# Patient Record
Sex: MALE | Race: WHITE
[De-identification: names, ages, dates, MRNs, and addresses within clinical notes are randomized per-mention and may not be internally consistent; named-entity substitution may affect disease eponyms.]

---

## 2017-05-22 ENCOUNTER — HOSPITAL ENCOUNTER (EMERGENCY)
Dept: HOSPITAL 44 - ED | Age: 42
Discharge: HOME | End: 2017-05-22
Payer: SELF-PAY

## 2017-05-22 VITALS — SYSTOLIC BLOOD PRESSURE: 132 MMHG | DIASTOLIC BLOOD PRESSURE: 78 MMHG

## 2017-05-22 DIAGNOSIS — N20.0: Primary | ICD-10-CM

## 2017-05-22 LAB
AMORPHOUS SEDIMENT,UR: (no result)
PH UR STRIP: 8.5 [PH] (ref 5–8)
RBC UR QL: (no result)
UROBILINOGEN URINE: 0.2 EU (ref 0.2–1)

## 2017-05-22 PROCEDURE — S1016 NON-PVC INTRAVENOUS ADMINIST: HCPCS

## 2017-05-22 PROCEDURE — 99284 EMERGENCY DEPT VISIT MOD MDM: CPT

## 2017-05-22 PROCEDURE — 96374 THER/PROPH/DIAG INJ IV PUSH: CPT

## 2017-05-22 PROCEDURE — 96372 THER/PROPH/DIAG INJ SC/IM: CPT

## 2017-05-22 PROCEDURE — 96361 HYDRATE IV INFUSION ADD-ON: CPT

## 2017-05-22 PROCEDURE — 81002 URINALYSIS NONAUTO W/O SCOPE: CPT

## 2017-05-22 PROCEDURE — 96375 TX/PRO/DX INJ NEW DRUG ADDON: CPT

## 2017-05-22 PROCEDURE — 87086 URINE CULTURE/COLONY COUNT: CPT

## 2017-05-22 PROCEDURE — 74176 CT ABD & PELVIS W/O CONTRAST: CPT

## 2017-05-22 NOTE — ED PHYSICIAN DOCUMENTATION
Male Genitourinary Problems





- HISTORIAN


Historian: patient





- HPI


Stated Complaint: flank pain


Chief Complaint: Male Genitourinary Problems


Onset: hours (0100)


Duration: continues in ED, worse


Severity: severe


Further Comments: yes (41 year old male patient presents with left flank pain 

which started at 0100, with nausea. Patient reports a history of renal calculi.)





- Associated Symptoms


Problems Urinating: pain w/ urination


Flank Pain: left sided


Abdominal Pain: LLQ





- ROS


CONST: none


GI/: nausea, problems urinating (pain).  denies: vomiting, abdominal pain


MS/SKIN/LYMPH: none


CVS/RESP: none


EYES/ENT: none





- PAST HX


Past History: other (9mm renal calculi 2007, seizures, HLD, depression)


Cardiac Disease: none


Allergies/Adverse Reactions: 


 Allergies











Allergy/AdvReac Type Severity Reaction Status Date / Time


 


No Known Allergies Allergy   Unverified 05/22/17 08:27














Home Medications: 


 Ambulatory Orders











 Medication  Instructions  Recorded


 


Cholecalciferol [Vitamin D-3] 2,000 unit PO DAILY 05/22/17


 


Fluoxetine HCl [Prozac] 60 mg PO DAILY 05/22/17


 


Phenytoin Sodium Extended 100 mg PO QID 05/22/17





[Dilantin]  


 


Simvastatin [Zocor] 40 mg PO D 05/22/17


 


Sulfamethoxazole/Trimethoprim 1 each PO BID #20 tab 05/22/17





[Bactrim Ds]  


 


Trazodone HCl [Desyrel] 100 mg PO PRN 05/22/17














- SOCIAL HX


Smoking History: cigarettes





- FAMILY HX


Family History: denies: none





- VITAL SIGNS


Vital Signs: 


 Vital Signs











Temp Pulse Resp BP Pulse Ox


 


 98.3 F   82   18   132/78   98 


 


 05/22/17 12:17  05/22/17 12:17  05/22/17 12:17  05/22/17 12:17  05/22/17 12:17














- REVIEWED ASSESSMENTS


Nursing Assessment  Reviewed: Yes


Vitals Reviewed: Yes





Progress





- Progress


Progress: 


No improvement in pain with toradol injection; 





Patient medicated for pain fentanyl.  





Consult with urology, Dr Santiago. Reviewed CT results - ok to discharge patient 

home, flomax and follow up in office.  





Additional pain medication given at discharge.  Instructed patient to return to 

ER if pain was not controlled at home. 





ED Results Lab/Radiology





- Lab Results


Lab Results: 


 Lab Results











  05/22/17





  08:05


 


Urine Color  Yellow 





   (YELLOW) 


 


Urine Appearance  Cloudy 





   (CLEAR) 


 


Urine pH  8.5 





   (5.0 - 8.0) 


 


Ur Specific Gravity  1.015 





   (1.010-1.030) 


 


Urine Protein  1+ mg/dL H mg/dL





   (NEGATIVE) 


 


Urine Ketones  Negative mg/dL mg/dL





   (NEGATIVE) 


 


Urine Occult Blood  2+  H 





   (NEGATIVE) 


 


Urine Nitrite  Negative 





   (NEGATIVE) 


 


Urine Bilirubin  Negative 





   (NEGATIVE) 


 


Urine Urobilinogen  0.2 Eu Eu





   (0.2-1.0) 


 


Ur Leukocyte Esterase  Negative 





   (NEGATIVE) 


 


Urine RBC  10-25  H 





   (0-2 HPF) 


 


Urine WBC  5-10  H 





   (0-5 HPF) 


 


Ur Squamous Epith Cells  Few 





   (NEG-FEW) 


 


Amorphous Sediment  Moderate  H 





   (NEGATIVE) 


 


Urine Bacteria  Moderate  H 





   (NEGATIVE) 


 


Urine Mucus  Present  H 





   (NEGATIVE) 


 


Urine Glucose  Negative mg/dL mg/dL





   (NEGATIVE) 














- Radiology


Radiology Impressions: 





HISTORY:   


Left flank pain and hematuria 





FINDINGS:   


Transverse abdomen and pelvis sections are obtained without contrast.  Mild 

left hydroureteronephrosis and perinephric stranding are observed.  A 5 x 11 mm 

proximal left ureteral stone is observed just beyond the ureteropelvic 

junction.  2 mm and 9 mm right intrarenal stones and a punctate left intrarenal 

stone are also present.  The gallbladder, liver, pancreas, adrenals, spleen, 

mesenteric structures, and great vessels are unremarkable.  Bowel loops exhibit 

normal caliber and wall thickness.  No significant lumbar abnormality is 

observed.  The appendix is normal.   





Pelvic sections reveal unremarkable bowel loops, prostate, seminal vesicles, 

and urinary bladder.  No acute pelvic abnormality is observed.  Pelvic 

phleboliths are noted. 





IMPRESSION:   


5 x 11 mm proximal left ureteral stone with mild obstructive uropathy. 





Bilateral intrarenal stones.


 


Electronically signed on May 22, 2017 10:31:46 AM CDT by:


Ayaz Keyes





- Orders


Orders: 


 ED Orders











 Category Date Time Status


 


 Place Saline Lock/IV NOW Care  05/22/17 08:24 Active


 


 CT ABD & PELVIS W/O CON Stat Exams  05/22/17 Completed


 


 UA W/MICRO IF INDICATED Stat Lab  05/22/17 08:05 Completed


 


 URINE CULTURE Stat Lab  05/22/17 08:05 Received


 


 0.9 % Sodium Chloride [Normal Saline] 1,000 ml Med  05/22/17 08:52 Discontinued





 IV .STK-MED   


 


 0.9 % Sodium Chloride [Normal Saline] 1,000 ml Med  05/22/17 08:50 Discontinued





 IV NOW   


 


 0.9 % Sodium Chloride [Normal Saline] 1,000 ml Med  05/22/17 09:58 Discontinued





 IV NOW   


 


 Butorphanol Tartrate [Stadol] Med  05/22/17 11:20 Discontinued





 2 mg IM NOW ONE   


 


 Ketorolac Tromethamine [Toradol] Med  05/22/17 08:52 Discontinued





 30 mg .ROUTE .STK-MED ONE   


 


 Ketorolac Tromethamine [Toradol] Med  05/22/17 08:50 Discontinued





 30 mg IVP NOW ONE   


 


 Tamsulosin HCl [Flomax] Med  05/22/17 10:36 Discontinued





 0.4 mg PO NOW ONE   


 


 fentaNYL CITRATE/PF [Duragesic] Med  05/22/17 09:25 Discontinued





 50 mcg IVP NOW ONE   














Male Genitourinary Problems





- EXAM


General Appearance: moderate distress


Abdomen: non-tender, no organomegaly


EENT: eye inspection normal, LINDY


Respiratory: no resp distress, chest non-tender, breath sounds normal


CVS: reg rate & rhythm, heart sounds normal, equal pulses, no murmur, no gallop

, PMI nml, no JVD, no friction rub, 24


Back: CVA tenderness (left)


Extremities: normal range of motion, non-tender, normal inspection, no pedal 

edema, no calf tenderness, normal capillary refill, pelvis stable


Neuro/Psych: oriented X3, CN's nml as tested, motor nml, sensation nml, mood/

affect nml


Skin: normal color, warm/dry, NR, INT, PAL, DR





Discharge


Clincal Impression: 


 Renal calculus, left





Prescriptions: 


Sulfamethoxazole/Trimethoprim [Bactrim Ds] 1 each PO BID #20 tab


Referrals: 


Primary Doctor,No [Primary Care Provider] - 2 Days


Additional Instructions: 


Diagnosis:


5x11mm stone


UTI





Call Dr Santiago office today - make a follow up visit asap.  Take your CD and 

report to the visit


[phone]











Return to the Er if you pain is not controlled, you are unable to void, or 

cannot keep down your medications. 


Home Medications: 


Ambulatory Orders





Cholecalciferol [Vitamin D-3] 2,000 unit PO DAILY 05/22/17 


Fluoxetine HCl [Prozac] 60 mg PO DAILY 05/22/17 


Phenytoin Sodium Extended [Dilantin] 100 mg PO QID 05/22/17 


Simvastatin [Zocor] 40 mg PO D 05/22/17 


Sulfamethoxazole/Trimethoprim [Bactrim Ds] 1 each PO BID #20 tab 05/22/17 


Trazodone HCl [Desyrel] 100 mg PO PRN 05/22/17 








Condition: Stable


Disposition: 01 HOME, SELF-CARE


Decision to Admit: NO


Decision Time: 12:05

## 2017-05-22 NOTE — DIAGNOSTIC IMAGING REPORT
[facility]

[address] [facility] [address]

[place], [place]. [address]

 

 

 

 

Report Submission Date: May 22, 2017 10:31:46 AM CDT

Patient       Study

Name:   ERNIE CALVIN       Date:   May 22, 2017 9:02:06 AM CDT

MRN:   [id number]       Modality Type:   CT\SR

Gender:   M       Description:   CT ABD & PELVIS W/O CO

:   77       Institution:   [facility]

Physician   RAMAN BRENNAN (NP) - ER            

 

Computed tomography of the abdomen and pelvis without contrast 



HISTORY:   

Left flank pain and hematuria 



FINDINGS:   

Transverse abdomen and pelvis sections are obtained without contrast.  Mild 
left hydroureteronephrosis and perinephric stranding are observed.  A 5 x 11 mm 
proximal left ureteral stone is observed just beyond the ureteropelvic 
junction.  2 mm and 9 mm right intrarenal stones and a punctate left intrarenal 
stone are also present.  The gallbladder, liver, pancreas, adrenals, spleen, 
mesenteric structures, and great vessels are unremarkable.  Bowel loops exhibit 
normal caliber and wall thickness.  No significant lumbar abnormality is 
observed.  The appendix is normal.   



Pelvic sections reveal unremarkable bowel loops, prostate, seminal vesicles, 
and urinary bladder.  No acute pelvic abnormality is observed.  Pelvic 
phleboliths are noted. 



IMPRESSION:   

5 x 11 mm proximal left ureteral stone with mild obstructive uropathy. 



Bilateral intrarenal stones.

 

Electronically signed on May 22, 2017 10:31:46 AM CDT by:

Ayaz DEY

## 2017-06-09 ENCOUNTER — HOSPITAL ENCOUNTER (EMERGENCY)
Dept: HOSPITAL 44 - ED | Age: 42
Discharge: HOME | End: 2017-06-09
Payer: SELF-PAY

## 2017-06-09 VITALS — SYSTOLIC BLOOD PRESSURE: 132 MMHG | DIASTOLIC BLOOD PRESSURE: 68 MMHG

## 2017-06-09 DIAGNOSIS — N20.0: Primary | ICD-10-CM

## 2017-06-09 PROCEDURE — 99283 EMERGENCY DEPT VISIT LOW MDM: CPT

## 2017-06-09 NOTE — ED PHYSICIAN DOCUMENTATION
Male Genitourinary Problems





- HISTORIAN


Historian: patient





- HPI


Stated Complaint: "PERCOCET REFILL"


Chief Complaint: Male Genitourinary Problems


Additional Information: 





here for percocet refill re kidney stones.  cleared prov by DR BRITO - yes by 

hosp adm after finding urologists out of town and confirming appt for 

lithotripsy at [facility] as [place] states.


Onset: other (chronic recurrent-lab reveals multi stones both kidneys prev ct 

[facility])


Duration: continues in ED


Context: drug use


Severity: moderate, severe





- Associated Symptoms


Abdominal Pain: none





- Sexual History


Sexual History: non-contributory





- ROS


CONST: none


GI/: nausea, abdominal pain.  denies: vomiting


MS/SKIN/LYMPH: none


CVS/RESP: none





- PAST HX


Past History: kidney stones (high cholesterol)


Cardiac Disease: none


Surgeries/Procedures: tonsillectomy


Allergies/Adverse Reactions: 


 Allergies











Allergy/AdvReac Type Severity Reaction Status Date / Time


 


No Known Allergies Allergy   Unverified 05/22/17 08:27














Home Medications: 


 Ambulatory Orders











 Medication  Instructions  Recorded


 


Cholecalciferol [Vitamin D-3] 2,000 unit PO DAILY 05/22/17


 


Fluoxetine HCl [Prozac] 60 mg PO DAILY 05/22/17


 


Phenytoin Sodium Extended 100 mg PO QID 05/22/17





[Dilantin]  


 


Simvastatin [Zocor] 40 mg PO D 05/22/17


 


Sulfamethoxazole/Trimethoprim 1 each PO BID #20 tab 05/22/17





[Bactrim Ds]  


 


Trazodone HCl [Desyrel] 100 mg PO HS PRN 05/22/17














- SOCIAL HX


Smoking History: non-smoker (none for 3 weeks)


Alcohol Use: none


Drug Use: none





- FAMILY HX


Family History: none





- VITAL SIGNS


Vital Signs: 





 Vital Signs











Temp Pulse Resp BP Pulse Ox


 


 97 F L  87   18   128/96   98 


 


 06/09/17 15:25  06/09/17 15:25  06/09/17 15:25  06/09/17 15:25  06/09/17 15:25














- REVIEWED ASSESSMENTS


Nursing Assessment  Reviewed: Yes


Vitals Reviewed: Yes





ED Results Lab/Radiology





- Orders


Orders: 





 ED Orders











 Category Date Time Status


 


 oxyCODONE HCL/ACETAMINOPHEN [Percocet 5-325 mg Tablet] Med  06/09/17 16:05 

Discontinued





 1 each PO .STK-MED ONE   


 


 oxyCODONE HCL/ACETAMINOPHEN [Percocet 5-325 mg Tablet] Med  06/09/17 16:07 

Discontinued





 1 each PO NOW ONE   














Male Genitourinary Problems





- EXAM


General Appearance: moderate distress


Abdomen: tenderness.  No: non-tender, guarding, rebound


EENT: ENT inspection normal


Respiratory: no resp distress, chest non-tender, breath sounds normal.  No: 

wheezes, rales, rhonchi


CVS: reg rate & rhythm, heart sounds normal


Extremities: normal range of motion


Neuro/Psych: oriented X3, motor nml, sensation nml, mood/affect nml


Skin: diaphoresis





Discharge


Clincal Impression: 


 Renal lithiasis





Referrals: 


Primary Doctor,No [Primary Care Provider] - 2 Days


Home Medications: 


Ambulatory Orders





Cholecalciferol [Vitamin D-3] 2,000 unit PO DAILY 05/22/17 


Fluoxetine HCl [Prozac] 60 mg PO DAILY 05/22/17 


Phenytoin Sodium Extended [Dilantin] 100 mg PO QID 05/22/17 


Simvastatin [Zocor] 40 mg PO D 05/22/17 


Sulfamethoxazole/Trimethoprim [Bactrim Ds] 1 each PO BID #20 tab 05/22/17 


Trazodone HCl [Desyrel] 100 mg PO HS PRN 05/22/17 








Condition: Fair


Disposition: 01 HOME, SELF-CARE


Decision to Admit: NO


Decision Time: 16:59

## 2017-07-19 ENCOUNTER — HOSPITAL ENCOUNTER (EMERGENCY)
Dept: HOSPITAL 44 - ED | Age: 42
Discharge: HOME | End: 2017-07-19
Payer: COMMERCIAL

## 2017-07-19 VITALS — DIASTOLIC BLOOD PRESSURE: 67 MMHG | SYSTOLIC BLOOD PRESSURE: 132 MMHG

## 2017-07-19 DIAGNOSIS — N20.0: Primary | ICD-10-CM

## 2017-07-19 LAB
AMORPHOUS SEDIMENT,UR: (no result)
BASOPHILS NFR BLD: 0.6 % (ref 0–1.5)
EGFR (AFRICAN): > 60
EGFR (NON-AFRICAN): > 60
EOSINOPHIL NFR BLD: 2.4 % (ref 0–6.8)
MCH RBC QN AUTO: 31.8 PG (ref 28–34)
MCV RBC AUTO: 91.6 FL (ref 80–100)
MONOCYTES %: 4.8 % (ref 0–11)
NEUTROPHILS #: 4.9 # K/UL (ref 1.4–7.7)
PH UR STRIP: 5.5 [PH] (ref 5–8)
RBC UR QL: (no result)
UROBILINOGEN URINE: 0.2 EU (ref 0.2–1)

## 2017-07-19 PROCEDURE — 99283 EMERGENCY DEPT VISIT LOW MDM: CPT

## 2017-07-19 PROCEDURE — 85025 COMPLETE CBC W/AUTO DIFF WBC: CPT

## 2017-07-19 PROCEDURE — 96361 HYDRATE IV INFUSION ADD-ON: CPT

## 2017-07-19 PROCEDURE — 81002 URINALYSIS NONAUTO W/O SCOPE: CPT

## 2017-07-19 PROCEDURE — 74176 CT ABD & PELVIS W/O CONTRAST: CPT

## 2017-07-19 PROCEDURE — 87086 URINE CULTURE/COLONY COUNT: CPT

## 2017-07-19 PROCEDURE — 80053 COMPREHEN METABOLIC PANEL: CPT

## 2017-07-19 PROCEDURE — S1016 NON-PVC INTRAVENOUS ADMINIST: HCPCS

## 2017-07-19 PROCEDURE — 96374 THER/PROPH/DIAG INJ IV PUSH: CPT

## 2017-07-19 NOTE — ED PHYSICIAN DOCUMENTATION
General Adult





- HISTORIAN


Historian: patient





- HPI


Stated Complaint: Hematuria


Chief Complaint: General Adult


Additional Information: 





Right Lithotripsy and Left stone retrieval with stent placement on 7/11, for 

two stones he says were 5x11 mm. Left flank and urethral pain and hematuria 

began this morning. Says he called his urologist and was told to come to ER. 





- ROS


CONST: no problems





- PAST HX


Past History: other (kidney stones)


Allergies/Adverse Reactions: 


 Allergies











Allergy/AdvReac Type Severity Reaction Status Date / Time


 


No Known Allergies Allergy   Verified 07/19/17 13:13














Home Medications: 


 Ambulatory Orders











 Medication  Instructions  Recorded


 


Cholecalciferol [Vitamin D-3] 2,000 unit PO DAILY 05/22/17


 


Fluoxetine HCl [Prozac] 60 mg PO DAILY 05/22/17


 


Phenytoin Sodium Extended 100 mg PO QID 05/22/17





[Dilantin]  


 


Simvastatin [Zocor] 40 mg PO D 05/22/17


 


Trazodone HCl [Desyrel] 100 mg PO HS PRN 05/22/17














- SOCIAL HX


Smoking History: non-smoker





- FAMILY HX


Family History: No





- VITAL SIGNS


Vital Signs: 





 Vital Signs











Temp Pulse Resp BP Pulse Ox


 


 99.1 F   69   18   147/94   97 


 


 07/19/17 13:14  07/19/17 13:14  07/19/17 13:14  07/19/17 13:14  07/19/17 13:14














- REVIEWED ASSESSMENTS


Nursing Assessment  Reviewed: Yes


Vitals Reviewed: Yes





Progress





- Progress


Progress: 














Examination: CT Abdomen/pelvis 





History: Left flank discomfort 





Comparison exams: 22 May 2017 





Technique: CT Abdomen/pelvis without contrast protocol.  





Findings: Renal cortical margins are symmetric. Left ureteral stent in place. 

No suspicious left-sided renal calcification. Right kidney demonstrates 

numerous calcifications involving the inferior margin: largest cluster measures 

7 mm. Mild dilation of the right ureter. At the ureterovesicular junction is 4 

mm calcification. Faint sub millimeter calcifications within the distal ureter. 

Bladder as visualized is without other gross irregularity given technique. 

Scattered pelvic phleboliths. 





Liver, spleen, adrenals, gallbladder and pancreas are without gross 

irregularity.  Bowel unopacified limiting evaluation. Appendix region without 

inflammatory changes.  No mesenteric inflammatory changes or free fluid. Hiatal 

hernia. 





Osseous structures demonstrate degenerative changes. Lung bases without 

infiltrate.  





Impression: Left ureteral stent in place. 





4 mm Right ureterolithiasis - stone at the right ureterovesicular junction. 

Dilation of the proximal right ureter with possible small submillimeter 

residual calcifications within the distal ureter lumen. 





Right nephrolithiasis. 





Hiatal hernia.





 








Electronically signed on Jul 19, 2017 2:19:49 PM CDT by:





Darshan Lao








Script filled 7/11for #50 5/325 percocet. Wants pain meds. 





ED Results Lab/Radiology





- Orders


Orders: 





 ED Orders











 Category Date Time Status


 


 Place IV Lock 1T Care  07/19/17 13:26 Active


 


 CT ABD & PELVIS W/O CON Stat Exams  07/19/17 Ordered


 


 CBC/PLATELET/DIFF Routine Lab  07/19/17 Ordered


 


 CMP [CMP] Routine Lab  07/19/17 Ordered


 


 UA [URINALYSIS] Routine Lab  07/19/17 13:30 Received


 


 0.9 % Sodium Chloride [Normal Saline] 1,000 ml Med  07/19/17 13:27 Active





 IV Q1H   


 


 Ketorolac Tromethamine [Toradol] Med  07/19/17 13:26 Discontinued





 30 mg IVP NOW ONE   














General Adult Physical Exam





- PHYSICAL EXAM


GENERAL APPEARANCE: mild distress


EENT: eye inspection normal, ENT inspection normal


NECK: normal inspection, supple


RESPIRATORY: no resp distress, breath sounds normal


CVS: reg rate & rhythm, heart sounds normal, no murmur


ABDOMEN: soft, normal bowel sounds


RECTAL: deferred


BACK: normal inspection, no CVA tenderness, other (no vertebral tenderness)


SKIN: warm/dry, normal color


EXTREMITIES: no evidence of injury, no edema


NEURO: CN's nml as tested, motor nml, sensation nml, cognition normal





Discharge


Clincal Impression: 


 Kidney stone on right side





Referrals: 


Primary Doctor,No [Primary Care Provider] - 2 Days


Home Medications: 


Ambulatory Orders





Cholecalciferol [Vitamin D-3] 2,000 unit PO DAILY 05/22/17 


Fluoxetine HCl [Prozac] 60 mg PO DAILY 05/22/17 


Phenytoin Sodium Extended [Dilantin] 100 mg PO QID 05/22/17 


Simvastatin [Zocor] 40 mg PO D 05/22/17 


Trazodone HCl [Desyrel] 100 mg PO HS PRN 05/22/17 








Condition: Good


Disposition: 01 HOME, SELF-CARE


Decision to Admit: NO


Decision Time: 14:30

## 2017-07-19 NOTE — DIAGNOSTIC IMAGING REPORT
SHOLA AVILA - BIB 

[facility]

[id number] [address]

[place], [place]. [address]

 

 

 

 

Report Submission Date: 2017 2:19:49 PM CDT

Patient       Study

Name:   ERNIE CALVIN       Date:   2017 1:47:02 PM CDT

MRN:   [id number]       Modality Type:   CT\SR

Gender:   M       Description:   CT ABD & PELVIS W/O CO

:   77       Institution:   [facility]

Physician:   SHOLA AVILA

         

 

 

Examination: CT Abdomen/pelvis 



History: Left flank discomfort 



Comparison exams: 22 May 2017 



Technique: CT Abdomen/pelvis without contrast protocol.  



Findings: Renal cortical margins are symmetric. Left ureteral stent in place. 
No suspicious left-sided renal calcification. Right kidney demonstrates 
numerous calcifications involving the inferior margin: largest cluster measures 
7 mm. Mild dilation of the right ureter. At the ureterovesicular junction is 4 
mm calcification. Faint sub millimeter calcifications within the distal ureter. 
Bladder as visualized is without other gross irregularity given technique. 
Scattered pelvic phleboliths. 



Liver, spleen, adrenals, gallbladder and pancreas are without gross 
irregularity.  Bowel unopacified limiting evaluation. Appendix region without 
inflammatory changes.  No mesenteric inflammatory changes or free fluid. Hiatal 
hernia. 



Osseous structures demonstrate degenerative changes. Lung bases without 
infiltrate.  



Impression: Left ureteral stent in place. 



4 mm Right ureterolithiasis - stone at the right ureterovesicular junction. 
Dilation of the proximal right ureter with possible small submillimeter 
residual calcifications within the distal ureter lumen. 



Right nephrolithiasis. 



Hiatal hernia.

 

Electronically signed on 2017 2:19:49 PM CDT by:

Darshan DEY

## 2018-02-13 ENCOUNTER — HOSPITAL ENCOUNTER (EMERGENCY)
Dept: HOSPITAL 44 - ED | Age: 43
Discharge: HOME | End: 2018-02-13
Payer: COMMERCIAL

## 2018-02-13 VITALS — DIASTOLIC BLOOD PRESSURE: 83 MMHG | SYSTOLIC BLOOD PRESSURE: 133 MMHG

## 2018-02-13 DIAGNOSIS — R10.84: Primary | ICD-10-CM

## 2018-02-13 LAB
BASOPHILS NFR BLD: 0.3 % (ref 0–1.5)
EGFR (AFRICAN): > 60
EGFR (NON-AFRICAN): > 60
EOSINOPHIL NFR BLD: 2.1 % (ref 0–6.8)
MCH RBC QN AUTO: 31.1 PG (ref 28–34)
MCV RBC AUTO: 92.2 FL (ref 80–100)
MONOCYTES %: 4.1 % (ref 0–11)
NEUTROPHILS #: 6.3 # K/UL (ref 1.4–7.7)

## 2018-02-13 PROCEDURE — 87086 URINE CULTURE/COLONY COUNT: CPT

## 2018-02-13 PROCEDURE — S1016 NON-PVC INTRAVENOUS ADMINIST: HCPCS

## 2018-02-13 PROCEDURE — 81002 URINALYSIS NONAUTO W/O SCOPE: CPT

## 2018-02-13 PROCEDURE — 99283 EMERGENCY DEPT VISIT LOW MDM: CPT

## 2018-02-13 PROCEDURE — 85025 COMPLETE CBC W/AUTO DIFF WBC: CPT

## 2018-02-13 PROCEDURE — 96376 TX/PRO/DX INJ SAME DRUG ADON: CPT

## 2018-02-13 PROCEDURE — 80053 COMPREHEN METABOLIC PANEL: CPT

## 2018-02-13 PROCEDURE — 96374 THER/PROPH/DIAG INJ IV PUSH: CPT

## 2018-02-13 NOTE — ED PHYSICIAN DOCUMENTATION
General Adult





- HISTORIAN


Historian: patient





- HPI


Stated Complaint: R flank pain


Chief Complaint: General Adult


Additional Information: 





Patient has had a history of kidney stones. Had a stone o n the right side and 

had lithotripsy done and a stint placed one week ago. Started to have have 

associated with the stint 1-2 days after procedure. Has had progressive 

discomfort associated with it. Pain in the bladder area and right flank area 

all the time. Worse post voiding. Has had some nausea and vomiting associated 

with pain. No fever noted. Has not passed any blood clots over the last two 

days. 


Timing: still present, worse


Modifying Factors: movement


Quality: intense sharp pain





- ROS


CONST: denies: fever, chills


GI/: denies: problems urinating





- PAST HX


Past History: kidney stones


Other History: none


Surgeries/Procedures: other (lithotripty)


Immunizations: referred to PCP


Allergies/Adverse Reactions: 


 Allergies











Allergy/AdvReac Type Severity Reaction Status Date / Time


 


No Known Allergies Allergy   Verified 02/13/18 10:54














Home Medications: 


 Ambulatory Orders











 Medication  Instructions  Recorded


 


Cholecalciferol [Vitamin D-3] 2,000 unit PO DAILY 05/22/17


 


Fluoxetine HCl [Prozac] 60 mg PO DAILY 05/22/17


 


Phenytoin Sodium Extended 100 mg PO QID 05/22/17





[Dilantin]  


 


Simvastatin [Zocor] 40 mg PO D 05/22/17


 


Trazodone HCl [Desyrel] 100 mg PO HS PRN 05/22/17














- SOCIAL HX


Smoking History: less than 1 pack/day


Alcohol Use: none


Drug Use: none





- FAMILY HX


Family History: No





- VITAL SIGNS


Vital Signs: 





 Vital Signs











Temp Pulse Resp BP Pulse Ox


 


 97.8 F   79   18   133/83   98 


 


 02/13/18 10:36  02/13/18 10:36  02/13/18 10:36  02/13/18 10:36  02/13/18 10:36














- REVIEWED ASSESSMENTS


Nursing Assessment  Reviewed: Yes


Vitals Reviewed: Yes





Progress





- Progress


Progress: 





11:10


toradal did not help with pain much, will give fentanyl IV





11:35


Pain improved





12:10


Pain returning





General Adult Physical Exam





- PHYSICAL EXAM


GENERAL APPEARANCE: mild distress


NECK: normal inspection


RESPIRATORY: no resp distress, chest non-tender, breath sounds normal.  No: 

wheezes, rales, rhonchi


CVS: reg rate & rhythm, heart sounds normal, equal pulses, no murmur, no gallop


ABDOMEN: soft, normal bowel sounds, no distension, tenderness (RLQ andright 

flank area).  No: rebound, guarding


BACK: CVA tenderness (R) (mild)


SKIN: warm/dry, normal color


NEURO: oriented X3, mood/affect nml, cognition normal





Discharge


Clincal Impression: 


 Flank pain





Referrals: 


Primary Doctor,No [Primary Care Provider] - 2 Days


Additional Instructions: 


Patient advised that [facility] will call in pain medication for him 

and to try to leave the stint in place until appointment to take it out. If you 

continue to have a lot of discomfort to follow-up with urologist or return to 

the ED. 


Condition: Stable


Disposition: 01 HOME, SELF-CARE


Decision to Admit: NO


Date of Decison to Admit: 02/13/18


Decision Time: 12:19

## 2018-02-14 LAB
APPEARANCE UR: CLEAR
COLOR,URINE: YELLOW
PH UR STRIP: 7.5 [PH] (ref 5–8)
RBC UR QL: (no result)
UROBILINOGEN URINE: 1 EU (ref 0.2–1)

## 2018-05-18 ENCOUNTER — HOSPITAL ENCOUNTER (EMERGENCY)
Dept: HOSPITAL 44 - ED | Age: 43
Discharge: HOME | End: 2018-05-18
Payer: COMMERCIAL

## 2018-05-18 VITALS — SYSTOLIC BLOOD PRESSURE: 128 MMHG | DIASTOLIC BLOOD PRESSURE: 72 MMHG

## 2018-05-18 DIAGNOSIS — Z87.442: ICD-10-CM

## 2018-05-18 DIAGNOSIS — R10.9: Primary | ICD-10-CM

## 2018-05-18 PROCEDURE — S1016 NON-PVC INTRAVENOUS ADMINIST: HCPCS

## 2018-05-18 PROCEDURE — 74176 CT ABD & PELVIS W/O CONTRAST: CPT

## 2018-05-18 PROCEDURE — 96374 THER/PROPH/DIAG INJ IV PUSH: CPT

## 2018-05-18 PROCEDURE — 87086 URINE CULTURE/COLONY COUNT: CPT

## 2018-05-18 PROCEDURE — 81002 URINALYSIS NONAUTO W/O SCOPE: CPT

## 2018-05-18 RX ADMIN — FENTANYL CITRATE ONE MCG: 50 INJECTION, SOLUTION INTRAMUSCULAR; INTRAVENOUS at 16:48

## 2018-05-18 NOTE — ED PHYSICIAN DOCUMENTATION
Abdominal Pain





- HISTORIAN


Historian: patient





- HPI


Stated Complaint: Lt flank pain


Chief Complaint: Abdominal Pain


Additonal Information: 





lt flank pain 8/10  pmh many prev gu lithiasis multi laser and stents


Onset: minutes, hours (0830)


Duration: constant, waxing, waning


Timing: worse


Context: denies: out of country travel, bad food


Severity: moderate


Quality: pain, cramping


Associated Symptoms: none, other (had loose stool this am)


Further Comments: yes (hx many large gu lithiasis)





- ROS


CONST: no problems


GI/: denies: bloody urine, bloody stools, dark urine


EYES/ENT: denies: problems with vision


MS/SKIN/LYMPH: none


NEURO/PSYCH: none





- SOCIAL HX


Smoking History: cigarettes


Alcohol Use: none


Drug Use: none





- FAMILY HX


Family History: kidney stones





- PAST HX


Past History: kidney stones, kidney infection, other (anxiety depression)


Surgeries/Procedures: other (multi k stones and stents)


Home Medications: 


 Ambulatory Orders











 Medication  Instructions  Recorded


 


Cholecalciferol [Vitamin D-3] 2,000 unit PO DAILY 05/22/17


 


Fluoxetine HCl [Prozac] 60 mg PO DAILY 05/22/17


 


Phenytoin Sodium Extended 100 mg PO QID 05/22/17





[Dilantin]  


 


Simvastatin [Zocor] 40 mg PO D 05/22/17


 


Trazodone HCl [Desyrel] 100 mg PO HS PRN 05/22/17











Allergies/Adverse Reactions: 


 Allergies











Allergy/AdvReac Type Severity Reaction Status Date / Time


 


No Known Allergies Allergy   Verified 02/13/18 10:54














- VITAL SIGNS


Vital Signs: 





 Vital Signs











Temp Pulse Resp BP Pulse Ox


 


 97.5 F L  88   18   128/89   99 


 


 05/18/18 15:04  05/18/18 15:04  05/18/18 15:04  05/18/18 15:04  05/18/18 15:04














- REVIEWED ASSESSMENTS


Nursing Assessment  Reviewed: Yes


Vitals Reviewed: Yes





ED Results Lab/Radiology





- Orders


Orders: 





 ED Orders











 Category Date Time Status


 


 CT ABDOMEN PELVIS S [CT ABD & PELVIS W/O CON] Stat Exams  05/18/18 Ordered


 


 fentaNYL CITRATE/PF [Duragesic] Med  05/18/18 16:39 Discontinued





 100 mcg IVP NOW ONE   














Abdominal Pain Physical Exam





- Physical Exam


General Appearance: mild distress, moderate distress


EENT: eye inspection normal


NECK: normal inspection, thyroid normal, supple


RESPIRATORY: no resp distress, chest non-tender, breath sounds normal


CVS: reg rate & rhythm, heart sounds normal


ABDOMEN: soft, tenderness (genl gabriel lt mid abdomen).  No: rebound, distended, 

guarding


SKIN: warm/dry, normal color.  No: cyanosis, diaphoresis, jaundice


EXTREMITIES: non-tender, normal range of motion, no evidence of injury, no edema


NEURO: oriented X3, motor nml, sensation nml, mood/affect nml


Vital Signs: 





 Vital Signs











Temp Pulse Resp BP Pulse Ox


 


 97.5 F L  88   18   128/89   99 


 


 05/18/18 15:04  05/18/18 15:04  05/18/18 15:04  05/18/18 15:04  05/18/18 15:04














Discharge


Clincal Impression: 


 un dx abdomen pain-constipation, pnh multi gu lithiasis, multi stones kidneys-

ureters clear, suspect constipation





Referrals: 


Primary Doctor,No [Primary Care Provider] - 2 Days


Comments: 





send ct disc w/ pt -f/u soon w/pcp or urologists.  rec laxative high fluid 

intake


Condition: Fair


Disposition: 01 HOME, SELF-CARE


Decision to Admit: NO


Decision Time: 17:21

## 2018-05-18 NOTE — DIAGNOSTIC IMAGING REPORT
SUSAN MILLER 

[facility]

[address] [facility] [address]

[place], [place]. [address]

 

 

 

 

Report Submission Date: May 18, 2018 4:52:39 PM CDT

Patient       Study

Name:   ERNIE CALVIN       Date:   May 18, 2018 4:19:01 PM CDT

MRN:   [id number]       Modality Type:   CT\SR

Gender:   M       Description:   CT ABD PELVIS W/O CO

:   77       Institution:   [facility]

Physician:   SUSAN MILLER

     Accession:    [id number]

 

 

Examination: CT Abdomen/pelvis 



History: CT A/P W/O, RENAL STONE PROTOCOL, LEFT FLANK PAIN SINCE NOON, HX OF 
KIDNEY STONES, MOST RECENTLY IN MID FEB. (Hx) 



Comparison exams: 2017 



Technique: CT Abdomen/pelvis without IV protocol.  



Findings:  Liver, spleen, adrenals, pancreas, kidneys and gallbladder are 
without gross irregularity given exam technique. No gallstone. Bilateral 
punctate calyceal calcifications. Ureters are nondilated in their course 
through the abdomen and pelvis. No central calcifications. Bladder margin 
within normal limits. Abdominal aorta without aneurysm or peripheral 
atherosclerotic disease. Cardiac silhouette is not enlarged. No pericardial 
effusion. 



Bowel unopacified limiting evaluation. No abnormal dilation. Stool within the 
large bowel limiting sensitivity. No mesenteric inflammatory changes or free 
fluid. Appendix is visualized and is without inflammatory changes.  



Osseous structures demonstrate early L5/S1 degenerative disease. No compression 
deformity. Lung bases without infiltrate.  No effusion. 



Impression: No abdominal mass or acute inflammatory process. 



No abnormal bowel dilation or inflammation.  



No gallstone. 



Bilateral nephrolithiasis. No abnormal ureteric dilation. 



No lung base consolidation or effusion.

 

Electronically signed on May 18, 2018 4:52:39 PM CDT by:

Darshan DEY

## 2018-05-19 LAB
APPEARANCE UR: CLEAR
COLOR,URINE: (no result)
PH UR STRIP: 8.5 [PH] (ref 5–8)
RBC UR QL: NEGATIVE
UROBILINOGEN URINE: >=8 EU (ref 0.2–1)

## 2018-06-08 ENCOUNTER — HOSPITAL ENCOUNTER (EMERGENCY)
Dept: HOSPITAL 44 - ED | Age: 43
Discharge: HOME | End: 2018-06-08
Payer: COMMERCIAL

## 2018-06-08 VITALS — DIASTOLIC BLOOD PRESSURE: 78 MMHG | SYSTOLIC BLOOD PRESSURE: 116 MMHG

## 2018-06-08 DIAGNOSIS — N40.1: Primary | ICD-10-CM

## 2018-06-08 LAB
APPEARANCE UR: CLEAR
COLOR,URINE: YELLOW
PH UR STRIP: 7.5 [PH] (ref 5–8)
RBC UR QL: NEGATIVE
UROBILINOGEN URINE: 0.2 EU (ref 0.2–1)

## 2018-06-08 PROCEDURE — 99284 EMERGENCY DEPT VISIT MOD MDM: CPT

## 2018-06-08 PROCEDURE — 51701 INSERT BLADDER CATHETER: CPT

## 2018-06-08 PROCEDURE — 81002 URINALYSIS NONAUTO W/O SCOPE: CPT

## 2018-06-08 NOTE — ED PHYSICIAN DOCUMENTATION
Male Genitourinary Problems





- HISTORIAN


Historian: patient





- HPI


Stated Complaint: difficulty urinating


Chief Complaint: Male Urogenital Problems


Additional Information: 





x 2 days, urinating small amounts


Onset: days ago (2)


Duration: continues in ED


Context: recent surgery (urinary stenting)


Severity: mild


Further Comments: no





- Associated Symptoms


Problems Urinating: frequent urination, other (samll amounts of urine/dribbling)

.  denies: discomfort w/ urination


Penile Discharge Descripiton: other (none)


Testicular Pain: none


Testicular Swelling: none


Penile Pain: No


Penile Swelling: No


Inguinal Mass: No


Abdominal Pain: mild, suprapubic





- Sexual History


Sexual History: non-contributory





- ROS


CONST: none


GI/: abdominal pain, problems urinating.  denies: nausea, vomiting


MS/SKIN/LYMPH: none


CVS/RESP: none


EYES/ENT: none


NEURO/PSYCH: denies: fainting, dizziness, tingling, numbness, anxiety, 

depression





- PAST HX


Past History: other (kidney stones)


Cardiac Disease: none


Surgeries/Procedures: none


Immunizations: referred to PCP


Allergies/Adverse Reactions: 


 Allergies











Allergy/AdvReac Type Severity Reaction Status Date / Time


 


No Known Allergies Allergy   Verified 06/08/18 14:31














Home Medications: 


 Ambulatory Orders











 Medication  Instructions  Recorded


 


Cholecalciferol [Vitamin D-3] 2,000 unit PO DAILY 05/22/17


 


Fluoxetine HCl [Prozac] 60 mg PO DAILY 05/22/17


 


Phenytoin Sodium Extended 100 mg PO QID 05/22/17





[Dilantin]  


 


Simvastatin [Zocor] 40 mg PO D 05/22/17


 


Trazodone HCl [Desyrel] 100 mg PO HS PRN 05/22/17














- SOCIAL HX


Smoking History: cigarettes


Alcohol Use: none


Drug Use: none





- FAMILY HX


Family History: none





- VITAL SIGNS


Vital Signs: 


 Vital Signs











Temp Pulse Resp BP Pulse Ox


 


 99.2 F   59 L  18   116/78   96 


 


 06/08/18 13:55  06/08/18 13:55  06/08/18 13:55  06/08/18 13:55  06/08/18 13:55














- REVIEWED ASSESSMENTS


Nursing Assessment  Reviewed: Yes


Vitals Reviewed: Yes





Progress





- Results/Orders


Results/Orders: 


ua ordered





- Progress


Progress: 





Pt. catheterized, 50 cc clear urine obtained





Critical Care Note





- Critical Care Note


Total Time (mins): 0





ED Results Lab/Radiology





- Lab Results


Lab Results: 


ua unremarkable





- Radiology


Radiology Impressions: 


none ordered





- Orders


Orders: 


 ED Orders











 Category Date Time Status


 


 [place] [Urinary catheterization] 1T Care  06/08/18 14:05 Active


 


 URINALYSIS Routine Lab  06/08/18 14:06 Ordered


 


 Tamsulosin HCl [Flomax] Med  06/08/18 14:40 Once





 0.4 mg PO NOW ONE   














Male Genitourinary Problems





- EXAM


General Appearance: mild distress


Abdomen: tenderness (suprapubic area)


Genitals: nml inspection


EENT: eye inspection normal, ENT inspection normal, pharynx normal, no signs of 

dehydration, LINDY


Neck: nml inspection


Respiratory: no resp distress


CVS: reg rate & rhythm, heart sounds normal, equal pulses


Back: non-tender.  No: CVA tenderness


Rectal: prostate enlarged (slightly).  No: prostate tenderness


Extremities: normal range of motion, non-tender, normal inspection, no pedal 

edema, no calf tenderness


Neuro/Psych: oriented X3, CN's nml as tested, motor nml, sensation nml, mood/

affect nml, cognition normal


Skin: warm/dry, normal color





Discharge


Clincal Impression: 


Benign prostate hyperplasia


Qualifiers:


 Lower urinary tract symptom presence: symptoms present Lower urinary tract 

symptom detail: incomplete bladder emptying Qualified Code(s): N40.1 - Benign 

prostatic hyperplasia with lower urinary tract symptoms





Referrals: 


Primary Doctor,No [Primary Care Provider] - 2 Days


Comments: 





increase Flomax to 0.8 mg p.o. daily for 7 days


Condition: Stable


Disposition: 01 HOME, SELF-CARE


Decision to Admit: NO


Decision Time: 14:48

## 2018-06-12 ENCOUNTER — HOSPITAL ENCOUNTER (EMERGENCY)
Dept: HOSPITAL 44 - ED | Age: 43
Discharge: HOME | End: 2018-06-12
Payer: COMMERCIAL

## 2018-06-12 VITALS — SYSTOLIC BLOOD PRESSURE: 130 MMHG | DIASTOLIC BLOOD PRESSURE: 70 MMHG

## 2018-06-12 DIAGNOSIS — H81.10: Primary | ICD-10-CM

## 2018-06-12 LAB
APPEARANCE UR: CLEAR
BASOPHILS NFR BLD: 0.2 % (ref 0–1.5)
COLOR,URINE: YELLOW
EGFR (AFRICAN): > 60
EGFR (NON-AFRICAN): > 60
EOSINOPHIL NFR BLD: 1.5 % (ref 0–6.8)
MCH RBC QN AUTO: 30.8 PG (ref 28–34)
MCV RBC AUTO: 92.6 FL (ref 80–100)
MONOCYTES %: 4.4 % (ref 0–11)
NEUTROPHILS #: 3.9 # K/UL (ref 1.4–7.7)
PH UR STRIP: 8.5 [PH] (ref 5–8)
RBC UR QL: NEGATIVE
UROBILINOGEN URINE: 0.2 EU (ref 0.2–1)

## 2018-06-12 PROCEDURE — 81002 URINALYSIS NONAUTO W/O SCOPE: CPT

## 2018-06-12 PROCEDURE — 80053 COMPREHEN METABOLIC PANEL: CPT

## 2018-06-12 PROCEDURE — 96366 THER/PROPH/DIAG IV INF ADDON: CPT

## 2018-06-12 PROCEDURE — 96365 THER/PROPH/DIAG IV INF INIT: CPT

## 2018-06-12 PROCEDURE — S1016 NON-PVC INTRAVENOUS ADMINIST: HCPCS

## 2018-06-12 PROCEDURE — 85025 COMPLETE CBC W/AUTO DIFF WBC: CPT

## 2018-06-12 NOTE — ED PHYSICIAN DOCUMENTATION
Dizziness





- HISTORIAN


Historian: patient





- HPI


Stated Complaint: dizzy


Chief Complaint: Dizziness


Additional Information: 





42 yo white male with a 2 day history of dizziness. No precipitating factor 

noted. Turning head, standing up brings dizziness on. No nausea noted. Has some 

fluttering anxiety feeling when he gets dizzy. No hearing problems, no tinnitus 

noted. 


Associated Symptoms: sense of falling.  denies: hearing loss, ringing in ear, 

roaring in ear, ear pain, nausea, vomiting, sense of spinning, light headedness

, fainted


Decreased Ability to Stand/ Walk: off balance.  denies: weak


Usually: walks w/o assistance


Worsened By: changing position, movement of head





- ROS


CONST: none


EYES/ENT: none





- PAST HX


Past History: hyperlipidemia, other (kidney stones, history of seizures)


Cardiac Disease: none


Surgeries/Procedures: none


Allergies/Adverse Reactions: 


 Allergies











Allergy/AdvReac Type Severity Reaction Status Date / Time


 


No Known Allergies Allergy   Verified 06/08/18 14:31














Home Medications: 


 Ambulatory Orders











 Medication  Instructions  Recorded


 


Cholecalciferol [Vitamin D-3] 2,000 unit PO DAILY 05/22/17


 


Phenytoin Sodium Extended 100 mg PO QID 05/22/17





[Dilantin]  


 


Simvastatin [Zocor] 40 mg PO D 05/22/17


 


Citalopram Hydrobromide [Celexa] 40 mg PO D 06/08/18


 


LORazepam [Ativan] 1 mg PO D 06/08/18


 


Oxycodone HCl/Acetaminophen 1 tab PO PRN PRN 06/08/18





[Primlev 5-300 mg Tablet]  


 


Tamsulosin HCl [Flomax] 0.4 mg PO D 06/08/18














- SOCIAL HX


Smoking History: less than 1 pack/day (1/2 ppd)


Alcohol Use: none


Drug Use: none





- FAMILY HX


Family History: none





- VITAL SIGNS


Vital Signs: 





 Vital Signs











Temp Pulse Resp BP Pulse Ox


 


 99.7 F H  87   16   136/76   99 


 


 06/12/18 11:03  06/12/18 11:16  06/12/18 11:03  06/12/18 11:16  06/12/18 11:03














- REVIEWED ASSESSMENTS


Nursing Assessment  Reviewed: Yes


Vitals Reviewed: Yes





Dizziness Physical Exam





- Physical Exam


General Appearance: mild distress


EENT: eye inspection normal, ENT inspection normal, pharynx normal, no signs of 

dehydration.  No: abnormal TM, hearing deficit, purulent nasal drainage


Neck: normal inspection, thyroid normal, supple


Respiratory: no respiratory distress, breath sounds nml.  No: chest non-tender, 

respiratory distress, wheezes, rales, rhonchi


CVS: reg rate & rhythm, heart sounds normal, equal pulses


Abdomen: soft, no organomegaly, normal bowel sounds, no abdominal bruit, no 

distension


Skin: warm/dry


Neuro: nml orientation, nml speech, nml cognition, mood/affect nml


Extremities: non-tender


Cranial: nml as tested, no evidence of acute CVA


Cerebellar: nml as tested


Sensorimotor: motor nml, sensation nml





Discharge


Clincal Impression: 


 Benign positional vertigo





Referrals: 


Primary Doctor,No [Primary Care Provider] - 2 Days


Additional Instructions: 


Drink a lot of fluids.  Take meclizine (Antivert) 25mg one tablets 4 times a 

day as needed for dizziness. If symptoms are not improving to see your primary 

care provider or return to the ED. 


Work excuse to return to work on 14 JUNE 2018 with no restrictions. 


Condition: Stable


Disposition: 01 HOME, SELF-CARE


Decision to Admit: NO


Date of Decison to Admit: 06/12/18


Decision Time: 13:17

## 2018-06-28 ENCOUNTER — HOSPITAL ENCOUNTER (EMERGENCY)
Dept: HOSPITAL 44 - ED | Age: 43
Discharge: HOME | End: 2018-06-28
Payer: COMMERCIAL

## 2018-06-28 VITALS — DIASTOLIC BLOOD PRESSURE: 94 MMHG | SYSTOLIC BLOOD PRESSURE: 138 MMHG
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
## 2018-06-28 DIAGNOSIS — S90.31XA: Primary | ICD-10-CM
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
## 2018-06-28 PROCEDURE — 90715 TDAP VACCINE 7 YRS/> IM: CPT

## 2018-06-28 PROCEDURE — 96372 THER/PROPH/DIAG INJ SC/IM: CPT

## 2018-06-28 PROCEDURE — 73630 X-RAY EXAM OF FOOT: CPT

## 2018-06-28 PROCEDURE — 90471 IMMUNIZATION ADMIN: CPT

## 2018-06-28 RX ADMIN — KETOROLAC TROMETHAMINE ONE: 30 INJECTION, SOLUTION INTRAMUSCULAR at 17:00

## 2018-06-28 RX ADMIN — KETOROLAC TROMETHAMINE ONE MG: 30 INJECTION, SOLUTION INTRAMUSCULAR at 16:07

## 2018-06-28 NOTE — DIAGNOSTIC IMAGING REPORT
MOSES BISHOP 

[facility]

[address] [facility] [address] [place] [address]. [id number]

 

 

 

 

Report Submission Date: 2018 4:22:53 PM CDT

Patient       Study

Name:   ERNIE CALVIN       Date:   2018 4:05:05 PM CDT

MRN:   [id number]       Modality Type:   DX

Gender:   M       Description:   LOWER EXTREMITY

:   77       Institution:   [facility]

Physician:   MOSES BISHOP

     Accession:    [id number]

 

 

Examination: Plain film right foot   



History:  RT FOOT, PAIN IN AREA OF 3-4 METATARSALS AFTER DROPPING HEAVY TOOL 
BOX ON FOOT TODAY (Hx) 



Findings: 3 views of the right foot demonstrates normal cortical margins. No 
fracture or dislocation.  No soft tissue swelling. No joint effusion. 



Impression: No acute osseous process.

 

Electronically signed on 2018 4:22:53 PM CDT by:

Darshan DEY

## 2018-06-28 NOTE — ED PHYSICIAN DOCUMENTATION
General Adult





- HISTORIAN


Historian: patient





- HPI


Stated Complaint: R foot pain


Chief Complaint: General Adult


Onset: minutes (45)


Timing: still present


Severity: moderate


Further Comments: yes (Pt is a 44 yo male who dropped a heavy tool box onto his 

R foot.  Pt has pain throughout anterior foot.  Pain with moving his toes.  

Tetanus status unknown.)





- ROS


CONST: no problems


EYES/ENT: none


CVS/RESP: none


GI/: none


MS/SKIN/LYMPH: other (abrasion ventral R foot, tenderness.)





- PAST HX


Past History: other (HLD, anxiety, BPH)


Allergies/Adverse Reactions: 


 Allergies











Allergy/AdvReac Type Severity Reaction Status Date / Time


 


No Known Allergies Allergy   Verified 06/28/18 15:54














Home Medications: 


 Ambulatory Orders











 Medication  Instructions  Recorded


 


Cholecalciferol [Vitamin D-3] 2,000 unit PO DAILY 05/22/17


 


Phenytoin Sodium Extended 100 mg PO QID 05/22/17





[Dilantin]  


 


Simvastatin [Zocor] 40 mg PO D 05/22/17


 


Citalopram Hydrobromide [Celexa] 40 mg PO D 06/08/18


 


LORazepam [Ativan] 1 mg PO D 06/08/18


 


Oxycodone HCl/Acetaminophen 1 tab PO PRN PRN 06/08/18





[Primlev 5-300 mg Tablet]  


 


Tamsulosin HCl [Flomax] 0.4 mg PO D 06/08/18














- SOCIAL HX


Smoking History: cigarettes





- FAMILY HX


Family History: No





- VITAL SIGNS


Vital Signs: 





 Vital Signs











Temp Pulse Resp BP Pulse Ox


 


 97.3 F L  80   17   138/94   97 


 


 06/28/18 15:50  06/28/18 15:50  06/28/18 15:50  06/28/18 15:50  06/28/18 15:50














- REVIEWED ASSESSMENTS


Nursing Assessment  Reviewed: Yes


Vitals Reviewed: Yes





Progress





- Progress


Progress: 


Toradol 60 mg IM








X-ray R foot:  3 views of the right foot demonstrates normal cortical margins. 

No fracture or dislocation.  No soft tissue swelling. No joint effusion.  

Impression: No acute osseous process.





 





Ibuprofen, ice, prn





ED Results Lab/Radiology





- Orders


Orders: 





 ED Orders











 Category Date Time Status


 


 FOOT 3 VIEWS OR MORE [RAD] Stat Exams  06/28/18 Ordered














General Adult Physical Exam





- PHYSICAL EXAM


GENERAL APPEARANCE: mild distress


NECK: normal inspection, supple


RESPIRATORY: no resp distress, chest non-tender, breath sounds normal


CVS: reg rate & rhythm, heart sounds normal


BACK: normal inspection


SKIN: other (minor abrasion ventral R foot; tenderness to palpation anterior R 

foot; FROM)


EXTREMITIES: normal range of motion, other (minor abrasion ventral R foot; 

tenderness to palpation anterior R foot; FROM)


NEURO: oriented X3, motor nml, sensation nml





Discharge


Clincal Impression: 


Contusion of right foot


Qualifiers:


 Encounter type: initial encounter Qualified Code(s): S90.31XA - Contusion of 

right foot, initial encounter





Referrals: 


Primary Doctor,No [Primary Care Provider] - 


Condition: Good


Disposition: 01 HOME, SELF-CARE


Decision to Admit: NO


Decision Time: 16:45